# Patient Record
Sex: FEMALE | Race: WHITE | NOT HISPANIC OR LATINO | Employment: FULL TIME | URBAN - METROPOLITAN AREA
[De-identification: names, ages, dates, MRNs, and addresses within clinical notes are randomized per-mention and may not be internally consistent; named-entity substitution may affect disease eponyms.]

---

## 2022-02-11 ENCOUNTER — NON-PROVIDER VISIT (OUTPATIENT)
Dept: OCCUPATIONAL MEDICINE | Facility: CLINIC | Age: 22
End: 2022-02-11

## 2022-02-11 DIAGNOSIS — Z02.1 PRE-EMPLOYMENT DRUG SCREENING: ICD-10-CM

## 2022-02-11 LAB
AMP AMPHETAMINE: NORMAL
COC COCAINE: NORMAL
INT CON NEG: NORMAL
INT CON POS: NORMAL
MET METHAMPHETAMINES: NORMAL
OPI OPIATES: NORMAL
PCP PHENCYCLIDINE: NORMAL
POC DRUG COMMENT 753798-OCCUPATIONAL HEALTH: NEGATIVE
THC: NORMAL

## 2022-02-11 PROCEDURE — 80305 DRUG TEST PRSMV DIR OPT OBS: CPT | Performed by: PREVENTIVE MEDICINE

## 2022-02-12 NOTE — PROGRESS NOTES
Sumaya Dinora Mansfield is a 21 y.o. female here for a non-provider visit for Pre employment Drug Screen

## 2022-08-31 ENCOUNTER — NON-PROVIDER VISIT (OUTPATIENT)
Dept: OCCUPATIONAL MEDICINE | Facility: CLINIC | Age: 22
End: 2022-08-31

## 2022-08-31 DIAGNOSIS — Z02.1 PRE-EMPLOYMENT DRUG SCREENING: ICD-10-CM

## 2022-08-31 PROCEDURE — 80305 DRUG TEST PRSMV DIR OPT OBS: CPT | Performed by: NURSE PRACTITIONER

## 2023-12-20 ENCOUNTER — TELEMEDICINE (OUTPATIENT)
Dept: BEHAVIORAL HEALTH | Facility: CLINIC | Age: 23
End: 2023-12-20
Payer: COMMERCIAL

## 2023-12-20 DIAGNOSIS — F33.1 MDD (MAJOR DEPRESSIVE DISORDER), RECURRENT EPISODE, MODERATE (HCC): ICD-10-CM

## 2023-12-20 DIAGNOSIS — G47.00 INSOMNIA, UNSPECIFIED TYPE: ICD-10-CM

## 2023-12-20 DIAGNOSIS — F41.1 GAD (GENERALIZED ANXIETY DISORDER): ICD-10-CM

## 2023-12-20 DIAGNOSIS — F90.2 ADHD (ATTENTION DEFICIT HYPERACTIVITY DISORDER), COMBINED TYPE: ICD-10-CM

## 2023-12-20 PROCEDURE — 90833 PSYTX W PT W E/M 30 MIN: CPT | Mod: 95 | Performed by: PSYCHIATRY & NEUROLOGY

## 2023-12-20 PROCEDURE — 99204 OFFICE O/P NEW MOD 45 MIN: CPT | Mod: 95 | Performed by: PSYCHIATRY & NEUROLOGY

## 2023-12-20 RX ORDER — FLUOXETINE HYDROCHLORIDE 20 MG/1
20 CAPSULE ORAL DAILY
Qty: 90 CAPSULE | Refills: 1 | Status: SHIPPED | OUTPATIENT
Start: 2023-12-20

## 2023-12-20 RX ORDER — TRAZODONE HYDROCHLORIDE 50 MG/1
TABLET ORAL
Qty: 30 TABLET | Refills: 5 | Status: SHIPPED | OUTPATIENT
Start: 2023-12-20 | End: 2024-01-03

## 2023-12-20 ASSESSMENT — ANXIETY QUESTIONNAIRES
5. BEING SO RESTLESS THAT IT IS HARD TO SIT STILL: NOT AT ALL
6. BECOMING EASILY ANNOYED OR IRRITABLE: SEVERAL DAYS
IF YOU CHECKED OFF ANY PROBLEMS ON THIS QUESTIONNAIRE, HOW DIFFICULT HAVE THESE PROBLEMS MADE IT FOR YOU TO DO YOUR WORK, TAKE CARE OF THINGS AT HOME, OR GET ALONG WITH OTHER PEOPLE: SOMEWHAT DIFFICULT
4. TROUBLE RELAXING: SEVERAL DAYS
1. FEELING NERVOUS, ANXIOUS, OR ON EDGE: SEVERAL DAYS
2. NOT BEING ABLE TO STOP OR CONTROL WORRYING: SEVERAL DAYS
3. WORRYING TOO MUCH ABOUT DIFFERENT THINGS: NEARLY EVERY DAY

## 2023-12-20 ASSESSMENT — PATIENT HEALTH QUESTIONNAIRE - PHQ9
5. POOR APPETITE OR OVEREATING: 1 - SEVERAL DAYS
CLINICAL INTERPRETATION OF PHQ2 SCORE: 1
SUM OF ALL RESPONSES TO PHQ QUESTIONS 1-9: 10

## 2023-12-20 NOTE — PROGRESS NOTES
[facility]  INITIAL PSYCHIATRY EVALUATION    This evaluation was conducted via Zoom, using secure and encrypted videoconferencing technology. The patient was physically located at their home address in [place], [place], and the physician was located at her home office in [place], [place]. The patient was presented by self. The patient’s identity was confirmed and verbal consent for the telemedicine encounter was obtained.      CC:  Initial Evaluation and Medication Management of Mental Health Symptoms      History Of Present Illness:  Sumaya Mansfield is a 23 y.o. female with history of MDD, OLVIN and ADHD, self referred, presents today.     The patient reported the following:  She stopped her medications 2 months ago and hasn't been feeling good, Adderall but wants to change back to Ritalin, Fluoxetine and Lorazepam for when she gets upset and that she was seeing Dr. Cantu but that her insurance does not cover seeing her.      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      Depression and Anxiety, has struggled with both since at least adolescents.  She also feels tired all the time, hard time falling asleep due to working 12h shifts, taking shuttle, and not having unwinding time and so sleep     ROS: As noted above in HPI.        Past Psychiatric History:  Denies any hospitalizations  Denies any history of SI, SA or self harm  Medication trials:  Adderall, Ritalin, Prozac, Ativan      Family Psychiatric History:  Addiction, ADHD, MDD and OLVIN    Substance Use/Addiction History:  Alcohol:  Denies  Cannabis:  Denies  Tobacco:  Denies  Caffeine:  up to 3 energy drinks on work days  Other:  Denies any other substances    Social History:  Oldest child, two younger  siblings.  She endorses a hx of A/T in childhood.    Allergies:  Patient has no allergy information on record.      Physical Examination and Mental Status Exam:  Vital signs: There were no vitals taken for this visit.    CONSTITUTIONAL:  General Appearance:  Clean, casual attire, good eye contact, engaged with provider    ORIENTATION:  Oriented to time, place and person  RECENT AND REMOTE MEMORY:  Grossly intact  ATTENTION SPAN AND CONCENTRATION:  within normal range  LANGUAGE:  no deficits appreciated  FUND OF KNOWLEDGE:  has awareness of current events, past history and normal vocabulary  SPEECH:  normal volume, amount, rate and articulation, no perseveration or paucity of language  MOOD:  Anxious   AFFECT:  Congruent with mood  THOUGHT PROCESS:  logical and goal directed  THOUGHT CONTENT:  Denies any SI/HI or AVH, no delusional thinking nor preoccupations appreciated  ASSOCIATIONS:  Intact, not loose, no tangentiality or circumstantiality  MEMORY:  No gross evidence of memory deficits  JUDGMENT:  adequate concerning everyday activities  INSIGHT:  adequate to psychiatric condition    DIAGNOSTIC IMPRESSION:  1. MDD (major depressive disorder), recurrent episode, moderate (HCC)  - FLUoxetine (PROZAC) 20 MG Cap; Take 1 Capsule by mouth every day.  Dispense: 90 Capsule; Refill: 1    2. OLVIN (generalized anxiety disorder)  - FLUoxetine (PROZAC) 20 MG Cap; Take 1 Capsule by mouth every day.  Dispense: 90 Capsule; Refill: 1    3. ADHD (attention deficit hyperactivity disorder), combined type  - Viloxazine HCl  MG CAPSULE SR 24 HR; Take 200 mg by mouth every morning.  Dispense: 90 Capsule; Refill: 1    4. Insomnia, unspecified type  - traZODone (DESYREL) 50 MG Tab; Take 1/4 to 1 tablets by mouth at bedtime as needed for insomnia  Dispense: 30 Tablet; Refill: 5       Assessment and Plan:  The patient's risk of suicide is assessed as low.  1.  MDD, recurrent, mild  OVLIN  Insomnia, hard time falling asleep on work  nights  ADHD by hx  Restart Fluoxetine 20 mg 1 qdaily  Begin Qelbree 200 mg 1 qAM, ADHD  Begin Trazodone 50 mg 1/4 to 1 QHS PRN insomnia  [facility] resource of how to fall asleep fast  Obtain records from former psychiatrist, Dr. Cantu, attach to a MyChart msg to this MD  [place] PDMP = 0 records  Called her pharmacy where she thought she got her medications filled 2 months ago and no records  Reviewed her medical record and [place] PDMP prior to the start of her appointment  Referral to [facility]    2.  The patient has a safety plan which included the 988 crisis text and phone line and going to the nearest ED if symptoms worsen.    3.  Risks, benefits, alternatives and side effects were discussed for all medicines prescribed at this visit.  The patient voiced understanding providing informed consent.  The patient agrees to call the clinic with any questions or concerns, or seek emergent medical care if warranted.    4.  Follow up in 4 weeks or call sooner PRN    The proposed treatment plan was discussed with the patient who was provided the opportunity to ask questions and make suggestions regarding alternative treatment. Patient verbalized understanding and expressed agreement with the plan.     Greater than 16 minutes  of the visit was spent in psychotherapy.  Psychotherapy include:  Provided the patient with supportive psychotherapy to build rapport and establish a therapeutic alliance with the patient, psychoeducation, topics: reviewed PHQ9 and GAD7, both positive, sleep hygiene, mental health history.    Caffeine's impact on insomnia and anxiety.    Monserrat Richter M.D.      This note was created using voice recognition software (Dragon). The accuracy of the dictation is limited by the abilities of the software. I have reviewed the note prior to signing, however some errors in grammar and context are still possible. If you have any questions related to this note please do not hesitate to contact our office.

## 2024-01-02 DIAGNOSIS — G47.00 INSOMNIA, UNSPECIFIED TYPE: ICD-10-CM

## 2024-01-03 RX ORDER — TRAZODONE HYDROCHLORIDE 50 MG/1
TABLET ORAL
Qty: 90 TABLET | Refills: 2 | Status: SHIPPED | OUTPATIENT
Start: 2024-01-03